# Patient Record
Sex: MALE | Race: WHITE | NOT HISPANIC OR LATINO | Employment: OTHER | URBAN - METROPOLITAN AREA
[De-identification: names, ages, dates, MRNs, and addresses within clinical notes are randomized per-mention and may not be internally consistent; named-entity substitution may affect disease eponyms.]

---

## 2022-05-04 DIAGNOSIS — R32 URINARY INCONTINENCE: Primary | ICD-10-CM

## 2022-06-02 ENCOUNTER — OFFICE VISIT (OUTPATIENT)
Dept: UROLOGY | Facility: CLINIC | Age: 77
End: 2022-06-02
Payer: MEDICARE

## 2022-06-02 VITALS
HEART RATE: 71 BPM | RESPIRATION RATE: 20 BRPM | DIASTOLIC BLOOD PRESSURE: 74 MMHG | SYSTOLIC BLOOD PRESSURE: 114 MMHG | TEMPERATURE: 97 F | WEIGHT: 213 LBS | BODY MASS INDEX: 36.37 KG/M2 | HEIGHT: 64 IN

## 2022-06-02 DIAGNOSIS — R32 URINARY INCONTINENCE: ICD-10-CM

## 2022-06-02 DIAGNOSIS — N40.1 BPH WITH OBSTRUCTION/LOWER URINARY TRACT SYMPTOMS: Primary | ICD-10-CM

## 2022-06-02 DIAGNOSIS — N13.8 BPH WITH OBSTRUCTION/LOWER URINARY TRACT SYMPTOMS: Primary | ICD-10-CM

## 2022-06-02 PROCEDURE — 99204 PR OFFICE/OUTPT VISIT, NEW, LEVL IV, 45-59 MIN: ICD-10-PCS | Mod: S$GLB,,, | Performed by: NURSE PRACTITIONER

## 2022-06-02 PROCEDURE — 99204 OFFICE O/P NEW MOD 45 MIN: CPT | Mod: S$GLB,,, | Performed by: NURSE PRACTITIONER

## 2022-06-02 RX ORDER — MULTIVITAMIN
1 TABLET ORAL DAILY
COMMUNITY

## 2022-06-02 RX ORDER — ATORVASTATIN CALCIUM 80 MG/1
TABLET, FILM COATED ORAL
COMMUNITY
Start: 2022-03-31

## 2022-06-02 RX ORDER — TIMOLOL MALEATE 5 MG/ML
SOLUTION/ DROPS OPHTHALMIC
COMMUNITY
Start: 2022-02-28

## 2022-06-02 RX ORDER — EMPAGLIFLOZIN 10 MG/1
TABLET, FILM COATED ORAL
COMMUNITY
Start: 2022-04-29

## 2022-06-02 RX ORDER — ENALAPRIL MALEATE 2.5 MG/1
TABLET ORAL
COMMUNITY
Start: 2022-01-25

## 2022-06-02 RX ORDER — METOPROLOL TARTRATE 75 MG/1
TABLET, FILM COATED ORAL
COMMUNITY
Start: 2022-04-21

## 2022-06-02 RX ORDER — LEVOCETIRIZINE DIHYDROCHLORIDE 5 MG/1
TABLET, FILM COATED ORAL
COMMUNITY
Start: 2022-05-11

## 2022-06-02 RX ORDER — CYANOCOBALAMIN 1000 UG/ML
INJECTION, SOLUTION INTRAMUSCULAR; SUBCUTANEOUS
COMMUNITY
Start: 2022-01-04

## 2022-06-02 RX ORDER — NITROGLYCERIN 0.4 MG/1
TABLET SUBLINGUAL
COMMUNITY
Start: 2022-03-09

## 2022-06-02 RX ORDER — GEMFIBROZIL 600 MG/1
TABLET, FILM COATED ORAL
COMMUNITY
Start: 2022-05-05

## 2022-06-02 RX ORDER — ASPIRIN 325 MG
325 TABLET ORAL DAILY
COMMUNITY

## 2022-06-02 RX ORDER — PREDNISOLONE ACETATE 10 MG/ML
SUSPENSION/ DROPS OPHTHALMIC
COMMUNITY
Start: 2022-02-28

## 2022-06-02 RX ORDER — METFORMIN HYDROCHLORIDE 500 MG/1
TABLET, EXTENDED RELEASE ORAL
COMMUNITY
Start: 2022-03-30

## 2022-06-02 NOTE — PROGRESS NOTES
Subjective:       Patient ID: Sohail Cardozo is a 76 y.o. male.    Chief Complaint: urinary incontience (leakage) and Urinary Frequency      HPI: 76-year-old male known new to the service seen today for symptoms of urinary incontinence described as urgency urge incontinence and sometimes leakage without warning.  He denies stress incontinence at this time.  Patient had prostate cancer followed by XRT 20 years ago in the care of Dr. Dustin Glaser long since retired.  He has not been followed with PSAs per patient.  Current symptoms have been ongoing for several weeks now.  He wears 1-2 pads.  His symptoms are worse during the daytime.  He wakens dry.  He denies any dysuria or gross hematuria.  No flank pain.  No unexplained weight loss or new onset bone pain.       Past Medical History:   Past Medical History:   Diagnosis Date    Blindness of right eye     Coronary artery disease     Diabetes mellitus     Elevated PSA     Hypertension     Unspecified urinary incontinence        Past Surgical Historical:   Past Surgical History:   Procedure Laterality Date    APPENDECTOMY      CYSTOSCOPY      PROSTATE SURGERY      scar tissue removal      from prostate area    tonsilectomy      Triple bypass  03/23/2022        Medications:   Medication List with Changes/Refills   Current Medications    ASPIRIN 325 MG TABLET    Take 325 mg by mouth once daily.    ATORVASTATIN (LIPITOR) 80 MG TABLET        CYANOCOBALAMIN 1,000 MCG/ML INJECTION    INJECT 1ML INTO THE MUSCLE ONCE MONTHLY    ENALAPRIL (VASOTEC) 2.5 MG TABLET        GEMFIBROZIL (LOPID) 600 MG TABLET        JARDIANCE 10 MG TABLET        LEVOCETIRIZINE (XYZAL) 5 MG TABLET        METFORMIN (GLUCOPHAGE-XR) 500 MG ER 24HR TABLET        METOPROLOL TARTRATE 75 MG TAB        MULTIVITAMIN (THERAGRAN) PER TABLET    Take 1 tablet by mouth once daily.    NITROGLYCERIN (NITROSTAT) 0.4 MG SL TABLET        PREDNISOLONE ACETATE (PRED FORTE) 1 % DRPS        TIMOLOL MALEATE 0.5%  (TIMOPTIC) 0.5 % DROP            Past Social History:   Social History     Socioeconomic History    Marital status:    Tobacco Use    Smoking status: Never Smoker    Smokeless tobacco: Never Used   Substance and Sexual Activity    Alcohol use: Never    Drug use: Never    Sexual activity: Yes     Partners: Female       Allergies:   Review of patient's allergies indicates:   Allergen Reactions    Percodan [oxycodone hcl-oxycodone-asa]         Family History:   Family History   Problem Relation Age of Onset    Clotting disorder Father     Heart disease Father     Peripheral vascular disease Father     Heart disease Mother     Heart block Mother     Heart disease Sister         Review of Systems:  Review of Systems   Constitutional: Negative for activity change and appetite change.   HENT: Negative for congestion and dental problem.    Eyes: Negative for visual disturbance.   Respiratory: Negative for chest tightness and shortness of breath.    Cardiovascular: Negative for chest pain.   Gastrointestinal: Negative for abdominal distention and abdominal pain.   Genitourinary: Positive for enuresis. Negative for decreased urine volume, difficulty urinating, dysuria, flank pain, frequency, genital sores, hematuria, penile discharge, penile pain, penile swelling, scrotal swelling, testicular pain and urgency.   Musculoskeletal: Negative for back pain and neck pain.   Skin: Negative for color change.   Neurological: Negative for dizziness.   Hematological: Negative for adenopathy.   Psychiatric/Behavioral: Negative for agitation, behavioral problems and confusion.       Physical Exam:  Physical Exam  Constitutional:       Appearance: He is well-developed.   HENT:      Head: Normocephalic.   Eyes:      General: No scleral icterus.  Pulmonary:      Effort: Pulmonary effort is normal.      Breath sounds: Normal breath sounds.   Abdominal:      General: There is no distension.      Palpations: Abdomen is soft.       Tenderness: There is no abdominal tenderness.      Hernia: No hernia is present. There is no hernia in the right inguinal area or left inguinal area.   Genitourinary:     Penis: Normal.       Testes: Normal. Cremasteric reflex is present.   Musculoskeletal:      Cervical back: Normal range of motion.   Skin:     General: Skin is warm and dry.   Neurological:      Mental Status: He is alert and oriented to person, place, and time.         Assessment/Plan:   Urgency/urge incontinence--urinalysis negative with the exception of glucose greater than a 1000 mg/dL (medication related); PVR 0 mL.  Trial Myrbetriq 50 mg 1 daily 2 week supply given re-evaluate 2 weeks    Prostate cancer--see HPI above serum PSA today   Problem List Items Addressed This Visit    None     Visit Diagnoses     Urinary incontinence        Relevant Orders    POCT Urinalysis (w/Micro Option)    POCT Bladder Scan

## 2022-06-03 LAB — PSA, DIAGNOSTIC: <0.014 NG/ML (ref 0–4)

## 2022-06-08 ENCOUNTER — TELEPHONE (OUTPATIENT)
Dept: UROLOGY | Facility: CLINIC | Age: 77
End: 2022-06-08
Payer: MEDICARE

## 2022-06-08 NOTE — TELEPHONE ENCOUNTER
Left message to return call regarding labs. MC LPN    ----- Message from Supa Montero NP sent at 6/7/2022  9:20 PM CDT -----  Please notify patient of lab results within stable range

## 2022-06-16 ENCOUNTER — CLINICAL SUPPORT (OUTPATIENT)
Dept: UROLOGY | Facility: CLINIC | Age: 77
End: 2022-06-16
Payer: MEDICARE

## 2022-06-16 DIAGNOSIS — N39.41 URGE INCONTINENCE OF URINE: Primary | ICD-10-CM

## 2022-06-16 LAB — POC RESIDUAL URINE VOLUME: 0 ML (ref 0–100)

## 2022-06-16 PROCEDURE — 99214 PR OFFICE/OUTPT VISIT, EST, LEVL IV, 30-39 MIN: ICD-10-PCS | Mod: S$GLB,,, | Performed by: UROLOGY

## 2022-06-16 PROCEDURE — 51798 US URINE CAPACITY MEASURE: CPT | Mod: S$GLB,,, | Performed by: UROLOGY

## 2022-06-16 PROCEDURE — 99214 OFFICE O/P EST MOD 30 MIN: CPT | Mod: S$GLB,,, | Performed by: UROLOGY

## 2022-06-16 PROCEDURE — 51798 POCT BLADDER SCAN: ICD-10-PCS | Mod: S$GLB,,, | Performed by: UROLOGY

## 2022-06-16 RX ORDER — MIRABEGRON 50 MG/1
50 TABLET, FILM COATED, EXTENDED RELEASE ORAL 2 TIMES DAILY
Qty: 60 TABLET | Refills: 11 | Status: SHIPPED | OUTPATIENT
Start: 2022-06-16 | End: 2023-01-05

## 2022-06-16 NOTE — PROGRESS NOTES
Subjective:       Patient ID: Sohail Cardozo is a 76 y.o. male.    Chief Complaint: Med Change Follow Up      HPI: 76-year-old male known to service last visit he was seen for complaints of urinary urgency urge incontinence.  He has prostate cancer patient having undergone XRT 20 some years ago.  He has PSAs that are stable at 0.0 in currently up-to-date on those labs.  He states his onset of symptoms urgency urge incontinence came after cardiac surgery.  Main problem is daytime.  His main fluid intake is water.  Very rare carbonated beverage a coke 0 maybe once weekly or every other week.  I gave him Myrbetriq 50 mg sample 2 weeks ago.  He states he has proximally 75% better.  No other urologic concerns       Past Medical History:   Past Medical History:   Diagnosis Date    Blindness of right eye     Coronary artery disease     Diabetes mellitus     Elevated PSA     Hypertension     Unspecified urinary incontinence        Past Surgical Historical:   Past Surgical History:   Procedure Laterality Date    APPENDECTOMY      CYSTOSCOPY      PROSTATE SURGERY      scar tissue removal      from prostate area    tonsilectomy      Triple bypass  03/23/2022        Medications:   Medication List with Changes/Refills   Current Medications    ASPIRIN 325 MG TABLET    Take 325 mg by mouth once daily.    ATORVASTATIN (LIPITOR) 80 MG TABLET        CYANOCOBALAMIN 1,000 MCG/ML INJECTION    INJECT 1ML INTO THE MUSCLE ONCE MONTHLY    ENALAPRIL (VASOTEC) 2.5 MG TABLET        GEMFIBROZIL (LOPID) 600 MG TABLET        JARDIANCE 10 MG TABLET        LEVOCETIRIZINE (XYZAL) 5 MG TABLET        METFORMIN (GLUCOPHAGE-XR) 500 MG ER 24HR TABLET        METOPROLOL TARTRATE 75 MG TAB        MULTIVITAMIN (THERAGRAN) PER TABLET    Take 1 tablet by mouth once daily.    NITROGLYCERIN (NITROSTAT) 0.4 MG SL TABLET        PREDNISOLONE ACETATE (PRED FORTE) 1 % DRPS        TIMOLOL MALEATE 0.5% (TIMOPTIC) 0.5 % DROP            Past Social History:    Social History     Socioeconomic History    Marital status:    Tobacco Use    Smoking status: Never Smoker    Smokeless tobacco: Never Used   Substance and Sexual Activity    Alcohol use: Never    Drug use: Never    Sexual activity: Yes     Partners: Female       Allergies:   Review of patient's allergies indicates:   Allergen Reactions    Percodan [oxycodone hcl-oxycodone-asa]         Family History:   Family History   Problem Relation Age of Onset    Clotting disorder Father     Heart disease Father     Peripheral vascular disease Father     Heart disease Mother     Heart block Mother     Heart disease Sister         Review of Systems:  Review of Systems   Constitutional: Negative for activity change and appetite change.   HENT: Negative for congestion and dental problem.    Eyes: Negative for visual disturbance.   Respiratory: Negative for chest tightness and shortness of breath.    Cardiovascular: Negative for chest pain.   Gastrointestinal: Negative for abdominal distention and abdominal pain.   Genitourinary: Positive for urgency. Negative for decreased urine volume, difficulty urinating, dysuria, enuresis, flank pain, frequency, genital sores, hematuria, penile discharge, penile pain, penile swelling, scrotal swelling and testicular pain.   Musculoskeletal: Negative for back pain and neck pain.   Skin: Negative for color change.   Neurological: Negative for dizziness.   Hematological: Negative for adenopathy.   Psychiatric/Behavioral: Negative for agitation, behavioral problems and confusion.       Physical Exam:  Physical Exam  Constitutional:       Appearance: He is well-developed.   HENT:      Head: Normocephalic.   Eyes:      General: No scleral icterus.  Pulmonary:      Effort: Pulmonary effort is normal.      Breath sounds: Normal breath sounds.   Abdominal:      General: There is no distension.      Palpations: Abdomen is soft.      Tenderness: There is no abdominal tenderness.       Hernia: No hernia is present. There is no hernia in the right inguinal area or left inguinal area.   Genitourinary:     Penis: Normal.       Testes: Normal. Cremasteric reflex is present.   Musculoskeletal:      Cervical back: Normal range of motion.   Skin:     General: Skin is warm and dry.   Neurological:      Mental Status: He is alert and oriented to person, place, and time.         Assessment/Plan:   Urinary urgency urge incontinence--increased dose of Myrbetriq 100 mg i.e. 50 mg twice daily Rx pharmacy of record.  PVR today is 0 mL  Problem List Items Addressed This Visit    None

## 2022-06-22 ENCOUNTER — TELEPHONE (OUTPATIENT)
Dept: UROLOGY | Facility: CLINIC | Age: 77
End: 2022-06-22
Payer: MEDICARE

## 2022-06-22 RX ORDER — SOLIFENACIN SUCCINATE 10 MG/1
10 TABLET, FILM COATED ORAL DAILY
Qty: 30 TABLET | Refills: 11 | Status: SHIPPED | OUTPATIENT
Start: 2022-06-22 | End: 2023-06-13 | Stop reason: SDUPTHER

## 2022-06-22 NOTE — TELEPHONE ENCOUNTER
Left message to check with pharmacy on new medication sent out per [facility]

## 2022-07-12 ENCOUNTER — TELEPHONE (OUTPATIENT)
Dept: UROLOGY | Facility: CLINIC | Age: 77
End: 2022-07-12
Payer: MEDICARE

## 2022-07-12 NOTE — TELEPHONE ENCOUNTER
Patient called and stated that he picked up the Vesicare and it was prescribed for 1 x day. He stated that he hasn't started it yet because he is completing the Myrbetriq samples he has left. He stated he was told to take Myrbetriq 2 x daily. Needing clarification on Vesicare when he starts it. Correct pharmacy on file. Please advise. SHAYNA GO    ----- Message from Jemima Pittman sent at 7/12/2022  1:25 PM CDT -----  Regarding: pt advice  Contact: Pt  Pt states that the rx for solifenacin (VESICARE) 10 MG tablet is wrong. States that he takes 2x day and it is prescribed for 1x day. Please call back at [phone]//thank you acc

## 2022-07-13 NOTE — TELEPHONE ENCOUNTER
Clarification patient stated that Myrbetriq is too expensive can't afford so he wanted to complete the samples. Then start the Vesicare He wants to know when he starts the Vesicare is it one po or bid. MC LPN

## 2022-08-16 ENCOUNTER — TELEPHONE (OUTPATIENT)
Dept: UROLOGY | Facility: CLINIC | Age: 77
End: 2022-08-16
Payer: MEDICARE

## 2022-08-16 NOTE — TELEPHONE ENCOUNTER
Left message that I will speak to NP and see if he will send out 90 day script instead of monthly. MC LPN      ----- Message from Faviola Martinez sent at 8/16/2022  2:26 PM CDT -----  Contact: pt wife      Who Called:mrs troncoso   Who Left Message for Patient:  Does the patient know what this is regarding?:pt would like 90 day rx instead of 30 Solifenacin succinate   Would the patient rather a call back or a response via [facility]?   Best Call Back Number:.[phone]    Additional Information: .  [id number] - [place] - [address] AT [facility] OF  ([address] [facility] & [address]  [address]  [place] [address]  Phone: [phone] Fax: [phone]

## 2022-08-17 NOTE — TELEPHONE ENCOUNTER
Called prescription in to pharmacy and changed to 90 day supply instead of 30 day supply per GIBSON Montero NP

## 2023-01-05 ENCOUNTER — OFFICE VISIT (OUTPATIENT)
Dept: UROLOGY | Facility: CLINIC | Age: 78
End: 2023-01-05
Payer: MEDICARE

## 2023-01-05 VITALS — RESPIRATION RATE: 20 BRPM | HEIGHT: 64 IN | BODY MASS INDEX: 36.37 KG/M2 | WEIGHT: 213 LBS

## 2023-01-05 DIAGNOSIS — N13.8 BPH WITH OBSTRUCTION/LOWER URINARY TRACT SYMPTOMS: ICD-10-CM

## 2023-01-05 DIAGNOSIS — N39.3 STRESS INCONTINENCE: ICD-10-CM

## 2023-01-05 DIAGNOSIS — N40.1 BPH WITH OBSTRUCTION/LOWER URINARY TRACT SYMPTOMS: ICD-10-CM

## 2023-01-05 DIAGNOSIS — N39.41 URGE INCONTINENCE OF URINE: Primary | ICD-10-CM

## 2023-01-05 LAB — POC RESIDUAL URINE VOLUME: 0 ML (ref 0–100)

## 2023-01-05 PROCEDURE — 99214 PR OFFICE/OUTPT VISIT, EST, LEVL IV, 30-39 MIN: ICD-10-PCS | Mod: S$GLB,,, | Performed by: NURSE PRACTITIONER

## 2023-01-05 PROCEDURE — 99214 OFFICE O/P EST MOD 30 MIN: CPT | Mod: S$GLB,,, | Performed by: NURSE PRACTITIONER

## 2023-01-05 PROCEDURE — 51798 US URINE CAPACITY MEASURE: CPT | Mod: S$GLB,,, | Performed by: NURSE PRACTITIONER

## 2023-01-05 PROCEDURE — 51798 POCT BLADDER SCAN: ICD-10-PCS | Mod: S$GLB,,, | Performed by: NURSE PRACTITIONER

## 2023-01-05 RX ORDER — CEFDINIR 300 MG/1
CAPSULE ORAL
COMMUNITY
Start: 2023-01-04

## 2023-01-05 NOTE — PROGRESS NOTES
Subjective:       Patient ID: Sohail Cardozo is a 77 y.o. male.    Chief Complaint: 6 mth f/u, Urinary incontinence, and Urinary Frequency      HPI: 77-year-old male known to service history of prostatectomy proximally 20 years ago followed up with XRT.  Patient also had some overactive bladder frequency urgency which is controlled on VESIcare 10 mg.  Last PSA this year 0.0 in that is stable.  His concern today is stress incontinence.  He walks proximally 3 miles a day.  He leaks with walking.  He also leaks with cough laugh sneeze lifting straining and position change.  He states he does not leak when he is in bed at night; nocturia x1.  He states postprocedure years ago for his prostatectomy he did Kegel exercise and he was dry.  Since his cardiac event he is had trouble retaining again he is put on some weight.  Pelvic floor exercises are not working for him.  Patient uses 3-4 pads daily with activity on days that he is not active 1 pad       Past Medical History:   Past Medical History:   Diagnosis Date    Blindness of right eye     Coronary artery disease     Diabetes mellitus     Elevated PSA     Hypertension     Unspecified urinary incontinence        Past Surgical Historical:   Past Surgical History:   Procedure Laterality Date    APPENDECTOMY      CYSTOSCOPY      PROSTATE SURGERY      scar tissue removal      from prostate area    tonsilectomy      Triple bypass  03/23/2022        Medications:   Medication List with Changes/Refills   Current Medications    ASPIRIN 325 MG TABLET    Take 325 mg by mouth once daily.    ATORVASTATIN (LIPITOR) 80 MG TABLET        CEFDINIR (OMNICEF) 300 MG CAPSULE        CYANOCOBALAMIN 1,000 MCG/ML INJECTION    INJECT 1ML INTO THE MUSCLE ONCE MONTHLY    ENALAPRIL (VASOTEC) 2.5 MG TABLET        GEMFIBROZIL (LOPID) 600 MG TABLET        JARDIANCE 10 MG TABLET        LEVOCETIRIZINE (XYZAL) 5 MG TABLET        METFORMIN (GLUCOPHAGE-XR) 500 MG ER 24HR TABLET        METOPROLOL  TARTRATE 75 MG TAB        MIRABEGRON (MYRBETRIQ) 50 MG TB24    Take 1 tablet (50 mg total) by mouth 2 (two) times a day.    MULTIVITAMIN (THERAGRAN) PER TABLET    Take 1 tablet by mouth once daily.    NITROGLYCERIN (NITROSTAT) 0.4 MG SL TABLET        PREDNISOLONE ACETATE (PRED FORTE) 1 % DRPS        SOLIFENACIN (VESICARE) 10 MG TABLET    Take 1 tablet (10 mg total) by mouth once daily.    TIMOLOL MALEATE 0.5% (TIMOPTIC) 0.5 % DROP            Past Social History:   Social History     Socioeconomic History    Marital status:    Tobacco Use    Smoking status: Never    Smokeless tobacco: Never   Substance and Sexual Activity    Alcohol use: Never    Drug use: Never    Sexual activity: Yes     Partners: Female       Allergies:   Review of patient's allergies indicates:   Allergen Reactions    Percodan [oxycodone hcl-oxycodone-asa]         Family History:   Family History   Problem Relation Age of Onset    Clotting disorder Father     Heart disease Father     Peripheral vascular disease Father     Heart disease Mother     Heart block Mother     Heart disease Sister         Review of Systems:  Review of Systems   Constitutional:  Negative for activity change and appetite change.   HENT:  Negative for congestion and dental problem.    Eyes:  Negative for visual disturbance.   Respiratory:  Negative for chest tightness and shortness of breath.    Cardiovascular:  Negative for chest pain.   Gastrointestinal:  Negative for abdominal distention and abdominal pain.   Genitourinary:  Positive for enuresis. Negative for decreased urine volume, difficulty urinating, dysuria, flank pain, frequency, genital sores, hematuria, penile discharge, penile pain, penile swelling, scrotal swelling, testicular pain and urgency.   Musculoskeletal:  Negative for back pain and neck pain.   Skin:  Negative for color change.   Neurological:  Negative for dizziness.   Hematological:  Negative for adenopathy.    Psychiatric/Behavioral:  Negative for agitation, behavioral problems and confusion.      Physical Exam:  Physical Exam  Constitutional:       Appearance: He is well-developed.   HENT:      Head: Normocephalic.   Eyes:      General: No scleral icterus.  Pulmonary:      Effort: Pulmonary effort is normal.      Breath sounds: Normal breath sounds.   Abdominal:      General: There is no distension.      Palpations: Abdomen is soft.      Tenderness: There is no abdominal tenderness.      Hernia: No hernia is present. There is no hernia in the right inguinal area or left inguinal area.   Genitourinary:     Penis: Normal.       Testes: Normal. Cremasteric reflex is present.   Musculoskeletal:      Cervical back: Normal range of motion.   Skin:     General: Skin is warm and dry.   Neurological:      Mental Status: He is alert and oriented to person, place, and time.       Assessment/Plan:   Prostate cancer--status post prostatectomy followed up with XRT 20 years ago.  PSAs are stable at 0.0    Urgency frequency--controlled with VESIcare 10 mg daily.    Stress incontinence--schedule cystoscopy to evaluate patient is possible candidate for microplastic versus artificial urinary sphincter.  Problem List Items Addressed This Visit    None  Visit Diagnoses       Urge incontinence of urine    -  Primary    Relevant Orders    POCT Bladder Scan    BPH with obstruction/lower urinary tract symptoms        Relevant Orders    POCT Bladder Scan

## 2023-01-27 ENCOUNTER — TELEPHONE (OUTPATIENT)
Dept: UROLOGY | Facility: CLINIC | Age: 78
End: 2023-01-27
Payer: MEDICARE

## 2023-01-30 ENCOUNTER — PROCEDURE VISIT (OUTPATIENT)
Dept: UROLOGY | Facility: CLINIC | Age: 78
End: 2023-01-30
Payer: MEDICARE

## 2023-01-30 VITALS
WEIGHT: 221 LBS | SYSTOLIC BLOOD PRESSURE: 134 MMHG | BODY MASS INDEX: 37.73 KG/M2 | DIASTOLIC BLOOD PRESSURE: 87 MMHG | HEIGHT: 64 IN | HEART RATE: 65 BPM

## 2023-01-30 DIAGNOSIS — N39.3 STRESS INCONTINENCE (FEMALE) (MALE): Primary | ICD-10-CM

## 2023-01-30 PROCEDURE — 52000 CYSTOSCOPY: ICD-10-PCS | Mod: S$GLB,,, | Performed by: UROLOGY

## 2023-01-30 PROCEDURE — 52000 CYSTOURETHROSCOPY: CPT | Mod: S$GLB,,, | Performed by: UROLOGY

## 2023-01-30 NOTE — PROCEDURES
Cystoscopy    Date/Time: 1/30/2023 1:30 PM  Performed by: Saúl Rose MD  Authorized by: Saúl Rose MD     Consent Done?:  Yes (Written)  Timeout: prior to procedure the correct patient, procedure, and site was verified    Prep: patient was prepped and draped in usual sterile fashion    Anesthesia:  Intraurethral instillation  Indications: hematuria    Position:  Supine  Anesthesia:  Intraurethral instillation  Patient sedated?: No    Preparation: Patient was prepped and draped in usual sterile fashion    Scope type:  Flexible cystoscope  External exam normal: Yes    Urethra normal: Yes    Prostate normal: Yes    Comments:      Patient was brought to the procedure room placed on the table padded prepped and draped in usual sterile fashion in supine position. The cystoscope was inserted into the urethra and advanced the urethra was normal, the urethra coapted quite nicely with flexion of the urinary sphincter muscle scope was advanced further the prostate was surgically absent and at the bladder neck there was a proximally 14 12-14 Guinean bladder neck contracture I was unable to advance the scope through this area the scope was removed the patient tolerated the procedure well there were no complications    Impression:  I had the patient flex his urinary sphincter muscle he actually shows great coaptation  Patient states he would not done Kegel exercises at all really over the past several years I would classify the patient has mild stress incontinence this point I would justify placing an artificial urinary sphincter for his level of incontinence I have encouraged him to to really try Kegel exercises hard for 3 months if this does not work we can get him referred for a male sling in [place]

## 2023-04-17 DIAGNOSIS — N39.41 URGE INCONTINENCE OF URINE: Primary | ICD-10-CM

## 2023-04-17 RX ORDER — SOLIFENACIN SUCCINATE 10 MG/1
10 TABLET, FILM COATED ORAL DAILY
Qty: 30 TABLET | Refills: 11 | Status: CANCELLED | OUTPATIENT
Start: 2023-04-17 | End: 2024-04-16

## 2023-06-13 RX ORDER — SOLIFENACIN SUCCINATE 10 MG/1
10 TABLET, FILM COATED ORAL DAILY
Qty: 30 TABLET | Refills: 3 | Status: SHIPPED | OUTPATIENT
Start: 2023-06-13 | End: 2023-09-12 | Stop reason: SDUPTHER

## 2023-06-13 NOTE — TELEPHONE ENCOUNTER
----- Message from Holly Santos sent at 6/13/2023 11:14 AM CDT -----  Contact: self  Type:  RX Refill Request    Who Called:  Sohail Cardozo   Refill or New Rx: REFILL  RX Name and Strength: solifenacin (VESICARE) 10 MG tablet  How is the patient currently taking it? (ex. 1XDay): 1 PER DAY  ##Is this a 30 day or 90 day RX: 90 DAY##    Preferred Pharmacy with phone number:   [id number] - [place], [place] - [address] AT  & [facility] [address]  [address]  [facility] [address]  Phone: [phone] Fax: [phone]    Local or Mail Order: LOCAL  Ordering Provider: WALLY CHUN  Would the patient rather a call back or a response via [facility]?  CALL BACK  Best Call Back Number: [phone]   Additional Information: N/A

## 2023-09-12 RX ORDER — SOLIFENACIN SUCCINATE 10 MG/1
10 TABLET, FILM COATED ORAL DAILY
Qty: 30 TABLET | Refills: 0 | Status: SHIPPED | OUTPATIENT
Start: 2023-09-12 | End: 2023-10-15

## 2023-09-12 NOTE — TELEPHONE ENCOUNTER
----- Message from Holly Santos sent at 9/12/2023  9:14 AM CDT -----  Contact: self  Type:  RX Refill Request    Who Called:  Sohail Cardozo   Refill or New Rx: REFILL  RX Name and Strength: solifenacin (VESICARE) 10 MG tablet  How is the patient currently taking it? (ex. 1XDay): 1 PER DAY  Is this a 30 day or 90 day RX: 90 DAY    Preferred Pharmacy with phone number:   [facility] - [place], [place] - [address] [address]  Phone: [phone] Fax: [phone]    Local or Mail Order: LOCAL  Ordering Provider: WALLY CHUN  Would the patient rather a call back or a response via [facility]?  CALL BACK  Best Call Back Number: [phone]   Additional Information: N/A

## 2023-10-15 RX ORDER — SOLIFENACIN SUCCINATE 10 MG/1
10 TABLET, FILM COATED ORAL
Qty: 30 TABLET | Refills: 0 | Status: SHIPPED | OUTPATIENT
Start: 2023-10-15 | End: 2023-11-01 | Stop reason: SDUPTHER

## 2023-10-16 ENCOUNTER — TELEPHONE (OUTPATIENT)
Dept: UROLOGY | Facility: CLINIC | Age: 78
End: 2023-10-16
Payer: MEDICARE

## 2023-10-16 NOTE — TELEPHONE ENCOUNTER
[facility] sent out #[phone] yesterday, pt needs appointment for fu. She did not want to make appointment today, stated she would call tomorrow.

## 2023-10-16 NOTE — TELEPHONE ENCOUNTER
----- Message from Will Culver sent at 10/16/2023  4:07 PM CDT -----  Contact: Minal  Patients wife is calling for a refill on medication. Preferred pharmacy is listed below. Patient would like a 90 day refill. Please give patient wife a call with any questions at [phone]      [facility] [address] [place] [address] [place], [place] - [address] [address]  Phone: [phone] Fax: [phone]

## 2023-11-01 ENCOUNTER — TELEPHONE (OUTPATIENT)
Dept: UROLOGY | Facility: CLINIC | Age: 78
End: 2023-11-01

## 2023-11-01 ENCOUNTER — OFFICE VISIT (OUTPATIENT)
Dept: UROLOGY | Facility: CLINIC | Age: 78
End: 2023-11-01
Payer: MEDICARE

## 2023-11-01 DIAGNOSIS — C61 PROSTATE CANCER: Primary | ICD-10-CM

## 2023-11-01 DIAGNOSIS — N39.41 URGE INCONTINENCE OF URINE: ICD-10-CM

## 2023-11-01 DIAGNOSIS — N39.41 URGE INCONTINENCE OF URINE: Primary | ICD-10-CM

## 2023-11-01 LAB
BILIRUBIN, UA POC OHS: NEGATIVE
BLOOD, UA POC OHS: ABNORMAL
CLARITY, UA POC OHS: CLEAR
COLOR, UA POC OHS: YELLOW
GLUCOSE, UA POC OHS: >=1000
KETONES, UA POC OHS: NEGATIVE
LEUKOCYTES, UA POC OHS: NEGATIVE
NITRITE, UA POC OHS: NEGATIVE
PH, UA POC OHS: 5.5
POC RESIDUAL URINE VOLUME: 0 ML (ref 0–100)
PROTEIN, UA POC OHS: NEGATIVE
PSA, DIAGNOSTIC: < 0.01 NG/ML (ref 0.1–4)
SPECIFIC GRAVITY, UA POC OHS: 1.02
UROBILINOGEN, UA POC OHS: 0.2

## 2023-11-01 PROCEDURE — 51798 US URINE CAPACITY MEASURE: CPT | Mod: S$GLB,,, | Performed by: NURSE PRACTITIONER

## 2023-11-01 PROCEDURE — 99214 PR OFFICE/OUTPT VISIT, EST, LEVL IV, 30-39 MIN: ICD-10-PCS | Mod: S$GLB,,, | Performed by: NURSE PRACTITIONER

## 2023-11-01 PROCEDURE — 99214 OFFICE O/P EST MOD 30 MIN: CPT | Mod: S$GLB,,, | Performed by: NURSE PRACTITIONER

## 2023-11-01 PROCEDURE — 81003 POCT URINALYSIS(INSTRUMENT): ICD-10-PCS | Mod: QW,S$GLB,, | Performed by: NURSE PRACTITIONER

## 2023-11-01 PROCEDURE — 81003 URINALYSIS AUTO W/O SCOPE: CPT | Mod: QW,S$GLB,, | Performed by: NURSE PRACTITIONER

## 2023-11-01 PROCEDURE — 51798 POCT BLADDER SCAN: ICD-10-PCS | Mod: S$GLB,,, | Performed by: NURSE PRACTITIONER

## 2023-11-01 RX ORDER — SOLIFENACIN SUCCINATE 10 MG/1
10 TABLET, FILM COATED ORAL DAILY
Qty: 90 TABLET | Refills: 3 | Status: SHIPPED | OUTPATIENT
Start: 2023-11-01

## 2023-11-01 NOTE — TELEPHONE ENCOUNTER
----- Message from Supa Montero NP sent at 11/1/2023  2:49 PM CDT -----  Please notify patient of lab results within stable range

## 2023-11-01 NOTE — PROGRESS NOTES
Subjective:       Patient ID: Sohail Cardozo is a 78 y.o. male.    Chief Complaint: Urinary Incontinence, Urinary Urgency, and Benign Prostatic Hypertrophy      HPI: 78-year-old male yearly follow-up history of prostate cancer status post remote prostatectomy.  Last PSA of record June of 2022 0.0.  Patient has history of mild stress incontinence.  Underwent cystoscopy in January of this year.  Had good coaptation of the urethral sphincter.  He was instructed to do Kegel exercises for 3 months and if unsuccessful would be referred for male urethral sling.  Patient states he continues to leak significantly with just walking and especially with lifting or straining.  He leaks without knowledge warning during these episodes.  He is doing well from a standpoint frequency urgency with he urge incontinence on solifenacin 10 mg daily needs refill.         Past Medical History:   Past Medical History:   Diagnosis Date    Blindness of right eye     Coronary artery disease     Diabetes mellitus     Elevated PSA     Hypertension     Prostate cancer     Unspecified urinary incontinence        Past Surgical Historical:   Past Surgical History:   Procedure Laterality Date    APPENDECTOMY      CYSTOSCOPY      PROSTATE SURGERY      scar tissue removal      from prostate area    tonsilectomy      Triple bypass  03/23/2022        Medications:   Medication List with Changes/Refills   Current Medications    ASPIRIN 325 MG TABLET    Take 325 mg by mouth once daily.    ATORVASTATIN (LIPITOR) 80 MG TABLET        CEFDINIR (OMNICEF) 300 MG CAPSULE        CYANOCOBALAMIN 1,000 MCG/ML INJECTION    INJECT 1ML INTO THE MUSCLE ONCE MONTHLY    ENALAPRIL (VASOTEC) 2.5 MG TABLET        GEMFIBROZIL (LOPID) 600 MG TABLET        JARDIANCE 10 MG TABLET        LEVOCETIRIZINE (XYZAL) 5 MG TABLET        METFORMIN (GLUCOPHAGE-XR) 500 MG ER 24HR TABLET        METOPROLOL TARTRATE 75 MG TAB        MULTIVITAMIN (THERAGRAN) PER TABLET    Take 1 tablet by mouth once  daily.    NITROGLYCERIN (NITROSTAT) 0.4 MG SL TABLET        PREDNISOLONE ACETATE (PRED FORTE) 1 % DRPS        SOLIFENACIN (VESICARE) 10 MG TABLET    Take 1 tablet by mouth once daily    TIMOLOL MALEATE 0.5% (TIMOPTIC) 0.5 % DROP            Past Social History:   Social History     Socioeconomic History    Marital status:    Tobacco Use    Smoking status: Never    Smokeless tobacco: Never   Substance and Sexual Activity    Alcohol use: Never    Drug use: Never    Sexual activity: Yes     Partners: Female       Allergies:   Review of patient's allergies indicates:   Allergen Reactions    Percodan [oxycodone hcl-oxycodone-asa]         Family History:   Family History   Problem Relation Age of Onset    Clotting disorder Father     Heart disease Father     Peripheral vascular disease Father     Heart disease Mother     Heart block Mother     Heart disease Sister         Review of Systems:  Review of Systems   Constitutional:  Negative for activity change and appetite change.   HENT:  Negative for congestion and dental problem.    Eyes:  Negative for visual disturbance.   Respiratory:  Negative for chest tightness and shortness of breath.    Cardiovascular:  Negative for chest pain.   Gastrointestinal:  Negative for abdominal distention and abdominal pain.   Genitourinary:  Positive for enuresis. Negative for decreased urine volume, difficulty urinating, dysuria, flank pain, frequency, genital sores, hematuria, penile discharge, penile pain, penile swelling, scrotal swelling, testicular pain and urgency.   Musculoskeletal:  Negative for back pain and neck pain.   Skin:  Negative for color change.   Neurological:  Negative for dizziness.   Hematological:  Negative for adenopathy.   Psychiatric/Behavioral:  Negative for agitation, behavioral problems and confusion.        Physical Exam:  Physical Exam  Constitutional:       Appearance: He is well-developed.   HENT:      Head: Normocephalic.   Eyes:      General: No  scleral icterus.  Pulmonary:      Effort: Pulmonary effort is normal.      Breath sounds: Normal breath sounds.   Abdominal:      General: There is no distension.      Palpations: Abdomen is soft.      Tenderness: There is no abdominal tenderness.      Hernia: No hernia is present. There is no hernia in the right inguinal area or left inguinal area.   Genitourinary:     Penis: Normal.       Testes: Normal. Cremasteric reflex is present.   Musculoskeletal:      Cervical back: Normal range of motion.   Skin:     General: Skin is warm and dry.   Neurological:      Mental Status: He is alert and oriented to person, place, and time.         Assessment/Plan:   Prostate cancer--status post prostatectomy.  PSA today    Stress incontinence--failed Kegel exercises.  Refer to urology in [place] based off of procedure note Dr. Rose January of 2023    Urgency or--doing well on VESIcare refilled same.  UA negative, PVR 0 mL  Problem List Items Addressed This Visit    None  Visit Diagnoses       Urge incontinence of urine    -  Primary    Relevant Orders    POCT Urinalysis(Instrument)    POCT Bladder Scan

## 2023-11-03 ENCOUNTER — TELEPHONE (OUTPATIENT)
Dept: UROLOGY | Facility: CLINIC | Age: 78
End: 2023-11-03
Payer: MEDICARE

## 2023-11-03 NOTE — TELEPHONE ENCOUNTER
----- Message from Jessica Hill sent at 11/3/2023  9:29 AM CDT -----  Contact: self  Type:  Patient Returning Call    Who Called:Sohail Cardozo  Who Left Message for Patient:Lety  Does the patient know what this is regarding?:PSA results  Would the patient rather a call back or a response via [facility]? Call back  Best Call Back Number:[phone]  Additional Information: N/a

## 2023-11-03 NOTE — TELEPHONE ENCOUNTER
----- Message from Siria Callahan sent at 11/3/2023  2:24 PM CDT -----  Contact: self  Type:  Patient Returning Call    Who Called:Sohail Cardozo  Who Left Message for Patient:hannah  Does the patient know what this is regarding?:results  Would the patient rather a call back or a response via [facility]?   Best Call Back Number:[phone]  Additional Information: n/a

## 2024-11-22 ENCOUNTER — TELEPHONE (OUTPATIENT)
Dept: UROLOGY | Facility: CLINIC | Age: 79
End: 2024-11-22
Payer: MEDICARE

## 2024-11-22 NOTE — TELEPHONE ENCOUNTER
Faxed denial for refill of vesicare, needs an appointment. Last seen by RC 11/23, NS 11/4/24 with SHAYNA NP.

## 2024-11-27 ENCOUNTER — OFFICE VISIT (OUTPATIENT)
Dept: UROLOGY | Facility: CLINIC | Age: 79
End: 2024-11-27
Payer: MEDICARE

## 2024-11-27 VITALS
SYSTOLIC BLOOD PRESSURE: 122 MMHG | HEIGHT: 65 IN | HEART RATE: 58 BPM | DIASTOLIC BLOOD PRESSURE: 64 MMHG | OXYGEN SATURATION: 97 % | WEIGHT: 206 LBS | RESPIRATION RATE: 18 BRPM | BODY MASS INDEX: 34.32 KG/M2

## 2024-11-27 DIAGNOSIS — N32.81 OAB (OVERACTIVE BLADDER): Primary | ICD-10-CM

## 2024-11-27 DIAGNOSIS — R39.15 URINARY URGENCY: ICD-10-CM

## 2024-11-27 DIAGNOSIS — N39.3 STRESS INCONTINENCE: ICD-10-CM

## 2024-11-27 DIAGNOSIS — Z85.46 HISTORY OF PROSTATE CANCER: ICD-10-CM

## 2024-11-27 LAB — PSA, DIAGNOSTIC: < 0.01 NG/ML (ref 0.1–4)

## 2024-11-27 PROCEDURE — 99214 OFFICE O/P EST MOD 30 MIN: CPT | Mod: S$PBB,,,

## 2024-11-27 RX ORDER — MIRABEGRON 50 MG/1
50 TABLET, FILM COATED, EXTENDED RELEASE ORAL DAILY
Qty: 30 TABLET | Refills: 11 | Status: SHIPPED | OUTPATIENT
Start: 2024-11-27 | End: 2025-11-27

## 2024-11-27 NOTE — PROGRESS NOTES
Subjective:       Patient ID: Shoail Cardozo is a 79 y.o. male.    Chief Complaint: No chief complaint on file.      HPI: 78-year-old male established patient here for yearly follow up.  Patient has a history of prostate cancer status post prostatectomy.  He has yearly PSA levels which have remained undetectable.  Patient also has a history of stress incontinence and underwent cystoscopy which revealed coaptation of the urethral sphincter.  He tried and failed Kegel exercises and was referred to [place] for evaluation for male urethral sling.  He reports today that he was evaluated in [place] and was told his stress incontinence was not severe enough to undergo surgery at that time.  Patient has a history of urge incontinence and is maintained on solifenacin 10 mg daily.  He reports today however that he is experiencing some urinary urgency with small amounts of leakage.  He also reports that he has small amounts of urinary leakage at night while sleeping.  He wakes with wet underwear but not fully saturated.  He also reports that when he has small amounts of leakage throughout the day it is without sensory awareness.  He denies dysuria, frequency, nocturia, hematuria, odor, fever or chills.               Past Medical History:   Past Medical History:   Diagnosis Date    Blindness of right eye     Coronary artery disease     Diabetes mellitus     Elevated PSA     Hypertension     Prostate cancer     Unspecified urinary incontinence        Past Surgical Historical:   Past Surgical History:   Procedure Laterality Date    APPENDECTOMY      CYSTOSCOPY      PROSTATE SURGERY      scar tissue removal      from prostate area    tonsilectomy      Triple bypass  03/23/2022        Medications:   Medication List with Changes/Refills   New Medications    MIRABEGRON (MYRBETRIQ) 50 MG TB24    Take 1 tablet (50 mg total) by mouth once daily.   Current Medications    ASPIRIN 325 MG TABLET    Take 325 mg by mouth once daily.     ATORVASTATIN (LIPITOR) 80 MG TABLET        CEFDINIR (OMNICEF) 300 MG CAPSULE        CYANOCOBALAMIN 1,000 MCG/ML INJECTION    INJECT 1ML INTO THE MUSCLE ONCE MONTHLY    ENALAPRIL (VASOTEC) 2.5 MG TABLET        GEMFIBROZIL (LOPID) 600 MG TABLET        JARDIANCE 10 MG TABLET        LEVOCETIRIZINE (XYZAL) 5 MG TABLET        METFORMIN (GLUCOPHAGE-XR) 500 MG ER 24HR TABLET        METOPROLOL TARTRATE 75 MG TAB        MULTIVITAMIN (THERAGRAN) PER TABLET    Take 1 tablet by mouth once daily.    NITROGLYCERIN (NITROSTAT) 0.4 MG SL TABLET        PREDNISOLONE ACETATE (PRED FORTE) 1 % DRPS        SOLIFENACIN (VESICARE) 10 MG TABLET    Take 1 tablet (10 mg total) by mouth once daily.    TIMOLOL MALEATE 0.5% (TIMOPTIC) 0.5 % DROP            Past Social History:   Social History     Socioeconomic History    Marital status:    Tobacco Use    Smoking status: Never    Smokeless tobacco: Never   Substance and Sexual Activity    Alcohol use: Never    Drug use: Never    Sexual activity: Yes     Partners: Female       Allergies:   Review of patient's allergies indicates:   Allergen Reactions    Percodan [oxycodone hcl-oxycodone-asa]         Family History:   Family History   Problem Relation Name Age of Onset    Clotting disorder Father      Heart disease Father      Peripheral vascular disease Father      Heart disease Mother      Heart block Mother      Heart disease Sister          Review of Systems:  Review of Systems   Constitutional: Negative.    HENT: Negative.     Eyes: Negative.    Respiratory: Negative.     Cardiovascular: Negative.    Gastrointestinal: Negative.    Endocrine: Negative.    Genitourinary:  Positive for urgency.   Musculoskeletal: Negative.    Skin: Negative.    Allergic/Immunologic: Negative.    Neurological: Negative.    Hematological: Negative.    Psychiatric/Behavioral: Negative.         Physical Exam:  Physical Exam  Constitutional:       Appearance: Normal appearance.   Cardiovascular:      Rate and  Rhythm: Normal rate.   Pulmonary:      Effort: Pulmonary effort is normal.   Abdominal:      General: Bowel sounds are normal.      Palpations: Abdomen is soft.   Neurological:      Mental Status: He is alert and oriented to person, place, and time.     Unable to provide urine sample  PVR 2 mL  Assessment/Plan:     Overactive bladder/urge urinary incontinence:  Patient has tried and failed VESIcare.  He attempted to try Myrbetriq in the past however had issues with insurance coverage.  Medication is now generic so we will attempt again to trial Myrbetriq 50 mg daily.  Instructed patient if there were issues with insurance coverage or out-of-pocket expense was too expensive to notify clinic and we will send refills on VESIcare.  Discussed effects of caffeine, tea, sodas, and alcohol on OAB symptoms. Instructed patient to decrease consumption of these fluids and to stop oral fluids approx 4 hours prior to bedtime to decrease night time symptoms. Education provided on bladder training, bladder control strategies, and PFT. Patient verbalized understanding.    History of prostate cancer:  We will draw patient's PSA level today and call him with the results    One-month follow up for Myrbetriq in 1 year follow up for history of prostate cancer and OAB    Problem List Items Addressed This Visit    None  Visit Diagnoses       OAB (overactive bladder)    -  Primary    Relevant Medications    mirabegron (MYRBETRIQ) 50 mg Tb24    Urinary urgency        Relevant Medications    mirabegron (MYRBETRIQ) 50 mg Tb24    Stress incontinence        History of prostate cancer

## 2024-12-27 ENCOUNTER — TELEPHONE (OUTPATIENT)
Dept: UROLOGY | Facility: CLINIC | Age: 79
End: 2024-12-27
Payer: MEDICARE

## 2024-12-27 NOTE — TELEPHONE ENCOUNTER
Contacted pt, advised that refills are already available at pharmacy. She requested that they both are seen by same NP. PT f/u changed to NP/MC per request. LEA    ----- Message from Siria sent at 12/27/2024  9:28 AM CST -----  Contact: janiya  Type:  RX Refill Request    Who Called: janiya  Refill or New Rx:refill  RX Name and Strength:mirabegron (MYRBETRIQ) 50 mg Tb24  How is the patient currently taking it? (ex. 1XDay):daily  Is this a 30 day or 90 day RX:90  Preferred Pharmacy with phone number:  [facility] [address] [facility], [place] - [address] [address]  Phone: [phone] Fax: [phone]      Local or Mail Order:local  Ordering Provider:nicole kendall  Would the patient rather a call back or a response via [facility]?   Best Call Back Number:[phone]  Additional Information: n/a

## 2025-02-04 ENCOUNTER — TELEPHONE (OUTPATIENT)
Dept: UROLOGY | Facility: CLINIC | Age: 80
End: 2025-02-04
Payer: MEDICARE

## 2025-02-04 NOTE — TELEPHONE ENCOUNTER
Attempted to return calll to pt, left VM.    ----- Message from Milady sent at 2/4/2025 10:15 AM CST -----  Contact: MICKEY LOZANO [[id number]]  ..Type:  Patient Requesting Call    Who Called: MICKEY LOZANO [[id number]]  What is the call regarding?: pt would like to go back on the old medication he was on. The new one works but is too expensive. If there is a generic of the new medication that was cheaper he would like to be prescribed that. Would like a 90 day supply  Would the patient rather a call back or a response via [facility]?  call  Best Call Back Number: [phone] (work)  Additional Information:

## 2025-02-24 ENCOUNTER — TELEPHONE (OUTPATIENT)
Dept: UROLOGY | Facility: CLINIC | Age: 80
End: 2025-02-24
Payer: MEDICARE

## 2025-02-24 RX ORDER — SOLIFENACIN SUCCINATE 10 MG/1
10 TABLET, FILM COATED ORAL DAILY
Qty: 90 TABLET | Refills: 2 | Status: SHIPPED | OUTPATIENT
Start: 2025-02-24

## 2025-02-24 NOTE — TELEPHONE ENCOUNTER
Patient call was returned and he asked to go back on Vesicare due to Myrbetriq costs too much. Patient was informed that it will be given to Scar Garcia NP. Patient asked for it to be sent to [facility] in [place].     ----- Message from Anabelle sent at 2/24/2025  9:06 AM CST -----  Patient is calling in regards to medication solifenacin (VESICARE) 10 MG tablet please call him back at [phone] or [phone]